# Patient Record
Sex: FEMALE | Race: WHITE | NOT HISPANIC OR LATINO | URBAN - METROPOLITAN AREA
[De-identification: names, ages, dates, MRNs, and addresses within clinical notes are randomized per-mention and may not be internally consistent; named-entity substitution may affect disease eponyms.]

---

## 2022-04-29 ENCOUNTER — NEW PATIENT (OUTPATIENT)
Dept: URBAN - METROPOLITAN AREA CLINIC 53 | Facility: CLINIC | Age: 62
End: 2022-04-29

## 2022-04-29 DIAGNOSIS — H40.053: ICD-10-CM

## 2022-04-29 DIAGNOSIS — Z01.01: ICD-10-CM

## 2022-04-29 DIAGNOSIS — H52.4: ICD-10-CM

## 2022-04-29 PROCEDURE — 76514 ECHO EXAM OF EYE THICKNESS: CPT

## 2022-04-29 PROCEDURE — 92004 COMPRE OPH EXAM NEW PT 1/>: CPT

## 2022-04-29 PROCEDURE — 92015 DETERMINE REFRACTIVE STATE: CPT

## 2022-04-29 RX ORDER — BRIMONIDINE TARTRATE, TIMOLOL MALEATE 2; 5 MG/ML; MG/ML: 1 SOLUTION/ DROPS OPHTHALMIC TWICE A DAY

## 2022-04-29 RX ORDER — PREDNISOLONE ACETATE 10 MG/ML: 1 SUSPENSION/ DROPS OPHTHALMIC

## 2022-04-29 ASSESSMENT — KERATOMETRY
OD_K1POWER_DIOPTERS: 45.75
OD_AXISANGLE2_DEGREES: 90
OS_AXISANGLE2_DEGREES: 90
OD_K2POWER_DIOPTERS: 45.75
OS_K2POWER_DIOPTERS: 46.00
OS_K1POWER_DIOPTERS: 46.00
OD_AXISANGLE_DEGREES: 180
OS_AXISANGLE_DEGREES: 180

## 2022-04-29 ASSESSMENT — TONOMETRY
OS_IOP_MMHG: 25
OD_IOP_MMHG: 25
OS_IOP_MMHG: 22
OD_IOP_MMHG: 21
OS_IOP_MMHG: 22
OD_IOP_MMHG: 24

## 2022-04-29 ASSESSMENT — VISUAL ACUITY
OS_PH: 20/25
OD_SC: 20/30
OU_SC: J1@16IN
OD_PH: 20/20
OU_SC: 20/25
OS_SC: 20/30

## 2022-04-29 ASSESSMENT — PACHYMETRY
OD_CT_UM: 605
OS_CT_UM: 601

## 2022-04-29 NOTE — PATIENT DISCUSSION
A month ago she has influenza A and then ended up with bronchitis. She was on Prednisone and IV solumedrol. Will start Combigan at this time as we are checking to see if she has ocular hypertension vs Steroid responder. She likely is a steroid responder given her history. Will request records from previous physician if able to see previous IOP. Will see her back in 4-6 weeks for an IOP check.

## 2022-04-29 NOTE — PATIENT DISCUSSION
Will start her on Prednisolone Acetate in the left eye four times a day for 4 weeks, 3 times a day for 3 weeks, two times a day for two weeks and one time a day for one week. Will start her on Combigan in both eyes twice a day as we are starting her on a steroid and she is possibly ocular hypertensive or a steroid responder. She is a new patient so it is unsure.

## 2022-05-04 ENCOUNTER — ESTABLISHED PATIENT (OUTPATIENT)
Dept: URBAN - METROPOLITAN AREA CLINIC 52 | Facility: CLINIC | Age: 62
End: 2022-05-04

## 2022-05-04 DIAGNOSIS — H40.053: ICD-10-CM

## 2022-05-04 DIAGNOSIS — H20.012: ICD-10-CM

## 2022-05-04 PROCEDURE — 92012 INTRM OPH EXAM EST PATIENT: CPT

## 2022-05-04 ASSESSMENT — TONOMETRY
OD_IOP_MMHG: 21
OD_IOP_MMHG: 17
OS_IOP_MMHG: 18
OS_IOP_MMHG: 22

## 2022-05-04 ASSESSMENT — VISUAL ACUITY
OS_SC: 20/40
OD_SC: 20/40

## 2022-05-04 NOTE — PATIENT DISCUSSION
Cell cleared OS. 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 Will start to taper Pred Acetate OS, decrease to TID for 5 days,  BID for 5 days, Qd for 5 days then discontinue. Advised no follow up is needed. Advised to call if worsens.

## 2022-06-09 ENCOUNTER — FOLLOW UP (OUTPATIENT)
Dept: URBAN - METROPOLITAN AREA CLINIC 50 | Facility: CLINIC | Age: 62
End: 2022-06-09

## 2022-06-09 DIAGNOSIS — H40.053: ICD-10-CM

## 2022-06-09 PROCEDURE — 92012 INTRM OPH EXAM EST PATIENT: CPT

## 2022-06-09 ASSESSMENT — TONOMETRY
OD_IOP_MMHG: 22
OD_IOP_MMHG: 18
OS_IOP_MMHG: 19
OS_IOP_MMHG: 23

## 2022-06-09 ASSESSMENT — VISUAL ACUITY
OU_SC: 20/25-1
OS_SC: 20/30-1
OD_SC: 20/30

## 2022-06-09 NOTE — PATIENT DISCUSSION
IOP 22/23, Adjusted 18/19. Patient to continue Latanoprost 1gtt QHS, OU. 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 She is working crazy hours and shifts, and her doses are all over the place. Discussed SLT with patient. Provided patient with brochure. Will schedule patient for SLT. Will see patient back in 6 months for IOP check.

## 2022-06-13 ENCOUNTER — CLINIC PROCEDURE ONLY (OUTPATIENT)
Dept: URBAN - METROPOLITAN AREA CLINIC 50 | Facility: CLINIC | Age: 62
End: 2022-06-13

## 2022-06-13 DIAGNOSIS — H40.052: ICD-10-CM

## 2022-06-13 PROCEDURE — 65855 TRABECULOPLASTY LASER SURG: CPT

## 2022-06-13 RX ORDER — PREDNISOLONE ACETATE 10 MG/ML: 1 SUSPENSION/ DROPS OPHTHALMIC TWICE A DAY

## 2022-06-13 ASSESSMENT — TONOMETRY
OD_IOP_MMHG: 21
OS_IOP_MMHG: 18
OS_IOP_MMHG: 22
OD_IOP_MMHG: 17

## 2022-06-13 ASSESSMENT — VISUAL ACUITY
OS_SC: 20/40
OD_SC: 20/30

## 2022-06-13 NOTE — PROCEDURE NOTE: CLINICAL
PROCEDURE NOTE: SLT OS. Diagnosis: Ocular Hypertension. Anesthesia: Topical. Prior to treatment, the risks/benefits/alternatives were discussed. The patient wished to proceed with procedure. Alphagan, Proparacaine and Pilocarpine given pre laser. The patient was seated at the laser and the Selective Laser Trabecculoplasty (SLT) gonio lens was placed on the ocular surface with protective lubricant to protect the ocular surface. Power: 1.2mJ. Pulses: 56.  Patient tolerated procedure well. There were no complications. Post Laser IOP: *. Post procedure instructions given. inferior 270°.

## 2022-06-13 NOTE — PATIENT DISCUSSION
SLT performed today OS, see signed consent. Will have patient start Pred Acetate BID OS for 7 days, once a day for 7 days then discontinue. Patient having SLT OD, advised patient to hold bottle and not discard.

## 2022-06-20 ENCOUNTER — CLINIC PROCEDURE ONLY (OUTPATIENT)
Dept: URBAN - METROPOLITAN AREA CLINIC 50 | Facility: CLINIC | Age: 62
End: 2022-06-20

## 2022-06-20 DIAGNOSIS — H40.051: ICD-10-CM

## 2022-06-20 PROCEDURE — 65855 TRABECULOPLASTY LASER SURG: CPT

## 2022-06-20 RX ORDER — PREDNISOLONE ACETATE 10 MG/ML: 1 SUSPENSION/ DROPS OPHTHALMIC TWICE A DAY

## 2022-06-20 ASSESSMENT — TONOMETRY
OD_IOP_MMHG: 22
OS_IOP_MMHG: 23
OD_IOP_MMHG: 18
OS_IOP_MMHG: 17

## 2022-06-20 ASSESSMENT — VISUAL ACUITY
OS_PH: 20/25
OD_SC: 20/30-1
OD_PH: 20/25
OU_SC: 20/25
OS_SC: 20/30

## 2022-06-20 NOTE — PATIENT DISCUSSION
SLT performed OD today, see signed consent. Will have patient start Pred Acetate BID OS for 7 days, once a day for 7 days then discontinue.

## 2022-06-20 NOTE — PROCEDURE NOTE: CLINICAL
PROCEDURE NOTE: SLT OD. Diagnosis: Ocular Hypertension. Anesthesia: Topical. Prior to treatment, the risks/benefits/alternatives were discussed. The patient wished to proceed with procedure. Alphagan, Proparacaine and Pilocarpine given pre laser. The patient was seated at the laser and the Selective Laser Trabecculoplasty (SLT) gonio lens was placed on the ocular surface with protective lubricant to protect the ocular surface. Power: 1.1mJ. Pulses: 67.  Patient tolerated procedure well. There were no complications. Post Laser IOP: *. Post procedure instructions given. inferior 270°.

## 2022-08-22 ENCOUNTER — FOLLOW UP (OUTPATIENT)
Dept: URBAN - METROPOLITAN AREA CLINIC 50 | Facility: CLINIC | Age: 62
End: 2022-08-22

## 2022-08-22 DIAGNOSIS — H40.053: ICD-10-CM

## 2022-08-22 PROCEDURE — 92012 INTRM OPH EXAM EST PATIENT: CPT

## 2022-08-22 ASSESSMENT — TONOMETRY
OS_IOP_MMHG: 20
OD_IOP_MMHG: 20

## 2022-08-22 ASSESSMENT — VISUAL ACUITY
OS_CC: 20/20-1
OU_CC: 20/20
OD_CC: 20/20-1

## 2022-08-22 NOTE — PATIENT DISCUSSION
Patient finished Pred Acetate as prescribed. Patient to continue Latanoprost 1gtt QHS, OU. Patient did not need refills at this time. Will see patient back in 6 months for IOP check. If IOP decreases at next visit will consider coming off Latanoprost at next visit.

## 2022-08-22 NOTE — PATIENT DISCUSSION
Patient pressure is doing better with drop therapy. Discussed the option of coming off drop. Recommended to stay on Lantaprost. Patient will continue drop therapy, if pressure is even better at next visit, she would like to try coming off the drops.

## 2022-12-15 ENCOUNTER — FOLLOW UP (OUTPATIENT)
Dept: URBAN - METROPOLITAN AREA CLINIC 50 | Facility: CLINIC | Age: 62
End: 2022-12-15

## 2022-12-15 PROCEDURE — 92133 CPTRZD OPH DX IMG PST SGM ON: CPT

## 2022-12-15 PROCEDURE — 92012 INTRM OPH EXAM EST PATIENT: CPT

## 2022-12-15 ASSESSMENT — VISUAL ACUITY
OU_CC: 20/20
OS_CC: 20/25
OD_CC: 20/25

## 2022-12-15 ASSESSMENT — TONOMETRY
OS_IOP_MMHG: 16
OS_IOP_MMHG: 20
OD_IOP_MMHG: 20
OD_IOP_MMHG: 16

## 2022-12-15 NOTE — PATIENT DISCUSSION
Reassured patient that her IOP decreased with the use of drop therapy. Will continue drop therapy. Will see her back in 6 months for an IOP check. Will only do RNFL once a year and no HVF.

## 2023-06-12 ENCOUNTER — FOLLOW UP (OUTPATIENT)
Dept: URBAN - METROPOLITAN AREA CLINIC 24 | Facility: CLINIC | Age: 63
End: 2023-06-12

## 2023-06-12 DIAGNOSIS — H40.053: ICD-10-CM

## 2023-06-12 PROCEDURE — 92012 INTRM OPH EXAM EST PATIENT: CPT

## 2023-06-12 ASSESSMENT — VISUAL ACUITY
OU_CC: 20/20-2
OD_CC: 20/20-2
OS_CC: 20/25+2

## 2023-06-12 ASSESSMENT — TONOMETRY
OS_IOP_MMHG: 21
OS_IOP_MMHG: 17
OD_IOP_MMHG: 20
OD_IOP_MMHG: 16

## 2023-09-11 ENCOUNTER — FOLLOW UP (OUTPATIENT)
Dept: URBAN - METROPOLITAN AREA CLINIC 24 | Facility: CLINIC | Age: 63
End: 2023-09-11

## 2023-09-11 DIAGNOSIS — H40.053: ICD-10-CM

## 2023-09-11 PROCEDURE — 92012 INTRM OPH EXAM EST PATIENT: CPT

## 2023-09-11 ASSESSMENT — VISUAL ACUITY
OU_CC: 20/25
OS_CC: 20/25-2
OD_CC: 20/25

## 2023-09-11 ASSESSMENT — TONOMETRY
OS_IOP_MMHG: 16
OS_IOP_MMHG: 20
OD_IOP_MMHG: 21
OD_IOP_MMHG: 17

## 2025-08-22 ENCOUNTER — COMPREHENSIVE EXAM (OUTPATIENT)
Age: 65
End: 2025-08-22

## 2025-08-22 DIAGNOSIS — H52.4: ICD-10-CM

## 2025-08-22 DIAGNOSIS — Z01.01: ICD-10-CM

## 2025-08-22 PROCEDURE — 92015 DETERMINE REFRACTIVE STATE: CPT

## 2025-08-22 PROCEDURE — 92014 COMPRE OPH EXAM EST PT 1/>: CPT
